# Patient Record
Sex: FEMALE | Race: WHITE | NOT HISPANIC OR LATINO | URBAN - METROPOLITAN AREA
[De-identification: names, ages, dates, MRNs, and addresses within clinical notes are randomized per-mention and may not be internally consistent; named-entity substitution may affect disease eponyms.]

---

## 2018-11-01 PROBLEM — 422587007: Status: ACTIVE | Noted: 2018-11-01

## 2020-10-02 ENCOUNTER — TELEPHONE ENCOUNTER (OUTPATIENT)
Dept: URBAN - METROPOLITAN AREA CLINIC 35 | Facility: CLINIC | Age: 30
End: 2020-10-02

## 2020-10-07 ENCOUNTER — LAB OUTSIDE AN ENCOUNTER (OUTPATIENT)
Dept: URBAN - METROPOLITAN AREA CLINIC 37 | Facility: CLINIC | Age: 30
End: 2020-10-07

## 2020-10-07 PROBLEM — 80313002: Status: ACTIVE | Noted: 2018-11-01

## 2020-10-07 PROBLEM — 8579004: Status: ACTIVE | Noted: 2018-11-01

## 2020-10-08 ENCOUNTER — OFFICE VISIT (OUTPATIENT)
Dept: URBAN - METROPOLITAN AREA CLINIC 37 | Facility: CLINIC | Age: 30
End: 2020-10-08

## 2020-10-08 VITALS — HEIGHT: 65 IN | WEIGHT: 106 LBS | BODY MASS INDEX: 17.66 KG/M2

## 2020-10-08 RX ORDER — OMEPRAZOLE 40 MG/1
1 CAPSULE CAPSULE, DELAYED RELEASE ORAL
Qty: 30 | Refills: 3 | Status: ACTIVE | COMMUNITY
Start: 2018-11-01

## 2020-10-08 RX ORDER — OMEPRAZOLE 40 MG/1
1 CAPSULE CAPSULE, DELAYED RELEASE ORAL
Qty: 30 | Refills: 2 | OUTPATIENT

## 2020-10-08 RX ORDER — ONDANSETRON HYDROCHLORIDE 8 MG/1
1 TABLET TABLET, FILM COATED ORAL TWICE A DAY
Qty: 60 TABLET | Refills: 1 | Status: ACTIVE | COMMUNITY
Start: 2018-11-01

## 2020-10-08 NOTE — HPI-MIGRATED HPI
Interim investigations : Imaging studies: ->  * US Abd on 10/01/2020: Normal echogenicity. No appreciable liver mass. No evidence of cholelithiasis. Normal upper abdomen study;   Acute visit : Patient is here for an acute visit -> Nausea, Abdominal pain and weight loss Onset of symptoms: 2 years ago Patient was seen almost two years ago due to abdominal pain after meals, nausea and unintentional weight loss At that time patient was prescribed Zofran 8 mg daily and Omeprazole 40 mg daily x 4 months  Patient was also advised to have a EGD but procedure was r/s once and then patient didnt show up to her procedure Patient is here to persistent symptoms Characteristics: she got pregnant in Mar 2019, she gained 40 lbs durug pregnancy. She delivered her son in Dec 2019 and gradually lost weight since then. In last visit she was 117 lbs, today she reports she weighs 106 lbs. She could eat 4-5 bites then stopped because feelinglike there are rocks in her stomach. SHe usually has loose stools but stil 3-4 times a day  Aggravating factors: Associated Sx: admits nausea but rarely she has  vomiting Medications tried: currently on Prilosec by PCP Previous test/evaluation done: US abdomen ordered per PCP, see below;

## 2020-10-28 ENCOUNTER — TELEPHONE ENCOUNTER (OUTPATIENT)
Dept: URBAN - METROPOLITAN AREA CLINIC 35 | Facility: CLINIC | Age: 30
End: 2020-10-28

## 2020-10-30 ENCOUNTER — OFFICE VISIT (OUTPATIENT)
Dept: URBAN - METROPOLITAN AREA SURGERY CENTER 8 | Facility: SURGERY CENTER | Age: 30
End: 2020-10-30

## 2020-11-06 ENCOUNTER — DASHBOARD ENCOUNTERS (OUTPATIENT)
Age: 30
End: 2020-11-06

## 2020-11-10 ENCOUNTER — LAB OUTSIDE AN ENCOUNTER (OUTPATIENT)
Dept: URBAN - METROPOLITAN AREA CLINIC 35 | Facility: CLINIC | Age: 30
End: 2020-11-10

## 2020-11-10 ENCOUNTER — OFFICE VISIT (OUTPATIENT)
Dept: URBAN - METROPOLITAN AREA CLINIC 37 | Facility: CLINIC | Age: 30
End: 2020-11-10

## 2020-11-10 ENCOUNTER — TELEPHONE ENCOUNTER (OUTPATIENT)
Dept: URBAN - METROPOLITAN AREA CLINIC 35 | Facility: CLINIC | Age: 30
End: 2020-11-10

## 2020-11-10 VITALS — BODY MASS INDEX: 17.04 KG/M2 | HEIGHT: 66 IN | WEIGHT: 106 LBS

## 2020-11-10 PROBLEM — 8493009: Status: ACTIVE | Noted: 2020-11-10

## 2020-11-10 PROBLEM — 442076002: Status: ACTIVE | Noted: 2020-11-10

## 2020-11-10 PROBLEM — 16761005: Status: ACTIVE | Noted: 2020-11-10

## 2020-11-10 PROBLEM — 448765001: Status: ACTIVE | Noted: 2018-11-01

## 2020-11-10 PROBLEM — 126151000119107: Status: ACTIVE | Noted: 2018-11-01

## 2020-11-10 RX ORDER — ONDANSETRON HYDROCHLORIDE 8 MG/1
1 TABLET TABLET, FILM COATED ORAL TWICE A DAY
Qty: 60 TABLET | Refills: 1 | Status: ON HOLD | COMMUNITY
Start: 2018-11-01

## 2020-11-10 RX ORDER — OMEPRAZOLE 40 MG/1
1 CAPSULE CAPSULE, DELAYED RELEASE ORAL
Qty: 30 | Refills: 2 | Status: ACTIVE | COMMUNITY

## 2020-11-10 RX ORDER — OMEPRAZOLE 40 MG/1
1 CAPSULE CAPSULE, DELAYED RELEASE ORAL
Qty: 30 | Refills: 3
Start: 2020-11-10

## 2020-11-10 NOTE — HPI-MIGRATED HPI
Post-op OV : After EGD on -> 10/30/2020. EGD report showed normal esophagus with bxx showed benign squamous mucosa with mild chronic inflammation and reactive changes which may be seen in reflux esophagitis. The stomach was found with mild gastritis in the antrum and body with bxx showed benign gastric mucosa with no significant histopathology. Negative for H.pylori and IM. Normal duodemum with bxx showed benign small bowel mucosa with normal villous pattern. Negative for increased intraepithelial lymphocytes.  EGD was done due to non-intentional weight lost, nausea, abdominal pain and history of bulimia which were reported last OV.  She had normal US Abd from [facility] in 10/2020 Patient is currently on Prilosec prescribed by her PCP;   Post-op OV : Patient denies -> rectal bleeding fever abdominal pain nausea & vomiting since the procedure;   Post-op OV : Patient -> denies any new changes in his/her health status since last OV;   Post-op OV : After ERCP on -> ;   Post-op OV : Flexible sigmoidoscopy on -> ;